# Patient Record
Sex: FEMALE | Race: WHITE
[De-identification: names, ages, dates, MRNs, and addresses within clinical notes are randomized per-mention and may not be internally consistent; named-entity substitution may affect disease eponyms.]

---

## 2017-11-17 ENCOUNTER — HOSPITAL ENCOUNTER (INPATIENT)
Dept: HOSPITAL 1 - ED | Age: 57
LOS: 4 days | Discharge: HOME | DRG: 871 | End: 2017-11-21
Attending: FAMILY MEDICINE | Admitting: FAMILY MEDICINE
Payer: COMMERCIAL

## 2017-11-17 VITALS — BODY MASS INDEX: 27.05 KG/M2 | WEIGHT: 147 LBS | HEIGHT: 62 IN

## 2017-11-17 DIAGNOSIS — R31.9: ICD-10-CM

## 2017-11-17 DIAGNOSIS — M06.9: ICD-10-CM

## 2017-11-17 DIAGNOSIS — K21.9: ICD-10-CM

## 2017-11-17 DIAGNOSIS — I10: ICD-10-CM

## 2017-11-17 DIAGNOSIS — E03.9: ICD-10-CM

## 2017-11-17 DIAGNOSIS — K22.10: ICD-10-CM

## 2017-11-17 DIAGNOSIS — E87.6: ICD-10-CM

## 2017-11-17 DIAGNOSIS — R65.10: ICD-10-CM

## 2017-11-17 DIAGNOSIS — A41.9: Primary | ICD-10-CM

## 2017-11-17 DIAGNOSIS — F41.9: ICD-10-CM

## 2017-11-17 DIAGNOSIS — Z90.49: ICD-10-CM

## 2017-11-17 DIAGNOSIS — N17.0: ICD-10-CM

## 2017-11-17 DIAGNOSIS — K52.9: ICD-10-CM

## 2017-11-17 DIAGNOSIS — M79.7: ICD-10-CM

## 2017-11-17 PROCEDURE — C9113 INJ PANTOPRAZOLE SODIUM, VIA: HCPCS

## 2017-11-18 VITALS — SYSTOLIC BLOOD PRESSURE: 151 MMHG | DIASTOLIC BLOOD PRESSURE: 84 MMHG

## 2017-11-18 VITALS — SYSTOLIC BLOOD PRESSURE: 155 MMHG | DIASTOLIC BLOOD PRESSURE: 98 MMHG

## 2017-11-18 VITALS — DIASTOLIC BLOOD PRESSURE: 91 MMHG | SYSTOLIC BLOOD PRESSURE: 157 MMHG

## 2017-11-18 VITALS — SYSTOLIC BLOOD PRESSURE: 178 MMHG | DIASTOLIC BLOOD PRESSURE: 95 MMHG

## 2017-11-18 VITALS — SYSTOLIC BLOOD PRESSURE: 193 MMHG | DIASTOLIC BLOOD PRESSURE: 102 MMHG

## 2017-11-18 VITALS — DIASTOLIC BLOOD PRESSURE: 101 MMHG | SYSTOLIC BLOOD PRESSURE: 154 MMHG

## 2017-11-18 VITALS — DIASTOLIC BLOOD PRESSURE: 71 MMHG | SYSTOLIC BLOOD PRESSURE: 156 MMHG

## 2017-11-18 LAB
ALBUMIN SERPL-MCNC: 3.9 G/DL (ref 3.4–5)
ALP SERPL-CCNC: 135 U/L (ref 46–116)
ALT SERPL-CCNC: 20 U/L (ref 14–59)
AMYLASE SERPL-CCNC: 45 U/L (ref 25–115)
AST SERPL-CCNC: 26 U/L (ref 15–37)
BASOPHILS NFR BLD: 0.1 % (ref 0–2)
BILIRUB SERPL-MCNC: 0.78 MG/DL (ref 0.2–1)
BUN SERPL-MCNC: 27 MG/DL (ref 7–18)
CALCIUM SERPL-MCNC: 9.9 MG/DL (ref 8.5–10.1)
CHLORIDE SERPL-SCNC: 102 MMOL/L (ref 98–107)
CHOLEST SERPL-MCNC: 212 MG/DL (ref ?–200)
CHOLEST/HDLC SERPL: 3.3 MG/DL
CO2 SERPL-SCNC: 17.9 MMOL/L (ref 21–32)
CREAT SERPL-MCNC: 2 MG/DL (ref 0.6–1)
ERYTHROCYTE [DISTWIDTH] IN BLOOD BY AUTOMATED COUNT: 15.3 % (ref 11.5–14.5)
GFR SERPLBLD BASED ON 1.73 SQ M-ARVRAT: 27 ML/MIN
GLUCOSE SERPL-MCNC: 149 MG/DL (ref 74–106)
HDLC SERPL-MCNC: 64 MG/DL (ref 40–60)
LIPASE SERPL-CCNC: 122 IU/L (ref 73–393)
MAGNESIUM SERPL-MCNC: 1.4 MG/DL (ref 1.8–2.4)
MICROSCOPIC UR-IMP: YES
PHOSPHATE SERPL-MCNC: 3.2 MG/DL (ref 2.5–4.9)
PLATELET # BLD: 339 X10^3MCL (ref 130–400)
POTASSIUM SERPL-SCNC: 3 MMOL/L (ref 3.5–5.1)
PROT SERPL-MCNC: 8.5 G/DL (ref 6.4–8.2)
RBC # UR STRIP.AUTO: (no result) /UL
SODIUM SERPL-SCNC: 138 MMOL/L (ref 136–145)
T4 FREE SERPL-MCNC: 0.82 NG/DL (ref 0.76–1.46)
TRIGL SERPL-MCNC: 158 MG/DL (ref ?–150)
UA SPECIFIC GRAVITY: 1.02 (ref 1–1.03)

## 2017-11-18 NOTE — NUR
PT SEEN, ASLEEP BUT EASILY AROUSABLE, ALERT AND ORIENTED, DENIES HEADACHE OR
DIZZINESS, BREATHING EVEN AND UNLABORED, NO SOB, LUNG SOUNDS CLEAR, ON ROOM
AIR WITH NO RESP DISTRESS NOTED, IVF INFUSING WELL, PULSES PALPABLE, NO EDEMA
NOTED, MILD GENERALIZED WEAKNESS, AMBULATORY WITH MINIMAL ASSIST, ABD SOFT AND
FLAT WITH ACTIVE BS, NO BM AT THIS TIME, POOR APPETITE AND C/O OF NAUSEOUS AT
TIMES, VOIDING FREELY, NO DISTRESS NOTED, WILL KEEP TO MONITOR.

## 2017-11-18 NOTE — NUR
PT GIVEN PHENERGEN PER DR. HUMMEL, PT STATED SOME ALLEVIATION OF NAUSEA,
ABLE TO TAKE MEDICATIONS, DENIES SOB, DENIES PAIN, CALL LIGHT WITHIN REACH,
WILL CONTINUE TO MONITOR.

## 2017-11-18 NOTE — NUR
CT TECH CALLED REGARDING PT'S CT ABD/PELVIS WITH IV CONTRAST, UNABLE TO DO
TEST AT THIS TIME DUE TO ELEVATED BUN/CR-27/2.0, MADE DR GUTIERREZ AWARE, PER DR GUTIERREZ WILL RE-DRAW PT'S LAB IN AM AND WILL DECIDE BY DAY TEAM.

## 2017-11-18 NOTE — NUR
PT RECEIVED DURING CHANGE OF SHIFT, A/OX4, TELE 38, NSR, DENIES CHEST PAIN,
PULSES PRESENT, NO EDEMA NOTED, LUNGS CTA ON RA, BREATHING EVEN AND UNLABORED,
DENIES SOB, BOWEL SOUNDS ACTIVE, CURRENTLY DENIES N/V, LBM 11/17/17 FORMED,
ABLE TO VOID, MILD GENERALIZED WEAKNESS, SKIN WARM/DRY/INTACT, DENIES ALL PAIN
AT THIS TIME, IV TO LAC INFUSING NS AT 100ML/HR, CALL LIGHT WITHIN REACH, WILL
CONTINUE TO MONITOR.

## 2017-11-18 NOTE — NUR
RECEIVED PT FROM ED. PT C/O CHEST PAIN 6/10, BLOOD PRESSURE 193/102, TEMP
101.2. DR WASHINGTON INFORMED. MEDICATED PER EMAR. ORIENTED PT TO ROOM. BED IN
LOWEST POSITION, SIDE RAILS UP X2, CALL LIGHT WITHIN REACH. WILL CONTINUE TO
MONITOR.

## 2017-11-18 NOTE — NUR
PT ASLEEP, NO INDICATION OF PAIN, BREATHING EVEN AND UNLABORED, CALL LIGHT
WITHIN REACH, WILL CONTINUE TO MONITOR.

## 2017-11-18 NOTE — NUR
PT C/O NASEA, WILL MEDICATE PER EMAR, UNABLE TO EAT DINNER, CALL LIGHT WITHIN
REACH, WILL ENDORSE PT TO ONCOMING SHIFT.

## 2017-11-18 NOTE — NUR
Initial Nutrition Assessment-
 
Dx: vomiting x 3 days, chest pain, shaking, MONIQUE, metabolic acidosis.
PMHx: hypothyroidism, RA, fibromyalgia, RA, GERD
PSHx: appendectomy, cholecystectomy, , gastric bypass, hip
replacement, rt shoulder reapir
Labs: Na 138, K 3 L, Glucose 149 H, BUN 27 H, Cr 2 H, Total protein 8.5 H, Alb
3.9, Ca 9.9, Phos 3.2,  H, Chol 212 H,  H, HDL 64 H, Lipase 122,
Hgb 12.9, HCT 39, HgbA1c  5.4.
Meds: aspirin, Ativan, Colace, flagyl, hydralazine, hydrochlorothiazide,
levaquin, Lipitor, Lopressor, lyrica, mag sulfate in sodium chl 0.9%,
magnesium sulfate, Mylanta, norco, protonix, reglan, synthroid, sodium chl
0.9%, synthroid, Tylenol, Zestril, zofran
Diet: NPO except meds
PO Intakes:  NPO
Ht:   62 inches (5'2") Wt: 66.81 kg (147 pounds)  BMI:   26.9 kg/m2,
overweight for age.
IBW:  110 pounds   %IBW: 134%  AdjBW due to > 125%IBW: 54 kg  UBW: Unable to
obtain; patient sleeping.
Age: 56
Food Allergies: No known food allergies
Skin: Michael 19
Edema: None noted
GI: Last BM 17, noted vomiting
 
Pt admitted with dx: SIRS, chest pain 2/2 costochondritis vs GERD, r/o ACS,
VMN, hypokalemia, hematuria, anxiety.
 
Per RN, patient experienced N/V this morning, Zofran and Phenergan given,
remains NPO. Patient asleep at time of visit, unable to interview.
 
Estimated Nutritional Needs Based on adjusted body weight of 54 kg due to
patient > 125%IBW.
Energy:  2769-0685 kcal/d (25-30 kcal/kg for adult maintenance, MONIQUE)
Protein: 32-43 gm/d (0.6-0.8 gm/kg for adult maintenance, MONIQUE)
Fluid:  8702-5309  mL/d (1 mL/kcal) or per MD.
 
Nutrition Diagnosis
1. Altered GI function related to pathophysiological causes as evidenced by
vomiting x 3 days.
2. Overweight for age related to energy imbalance as evidenced by BMI of 26.9
kg/m2.
 
Intervention/RDN Recommendation(s):
1. When medically appropriate per MD, consider initiating cardiac diet.
 
Monitor/Evaluate
 Goal: Initiation of oral nutrition with intake via PO intakes to meet least
75% of estimated needs.
 Monitor: Diet advancement, PO intakes and/or nutrition support tolerance,
Labs, GI function
 F/U in 3-5 days as moderate risk (-)

## 2017-11-18 NOTE — NUR
RDN Recommendation(s):
1. Continue NPO as medically appropriate per MD.
2. When medically appropriate per MD, consider initiating clear liquid diet
and advancing to cardiac diet as tolerated.
3. Consult RDN prn.

## 2017-11-18 NOTE — NUR
PATIENT SEEN WITH COMPLAINT OF VOMITING , CHEST PAIN AND BEING SHAKY. WAS SEEN
IN URGENT CARE EARLIER

## 2017-11-18 NOTE — NUR
BLOOD PRESSURE 157/91, TEMP REDUCED TO 99.6 POST MEDICATION ADMINISTRATION.
WILL CONTINUE TO MONITOR.

## 2017-11-18 NOTE — NUR
PT SLEPT PERIODICALLY THROUGHOUT NIGHT, NO ACUTE DISTRESS. ALL NEEDS MET AND
ATTENDED TO. NO SIGNIFICANT CHANGES. IV PATENT AND INTACT. BED IN LOWEST
POSITION, SIDE RAILS UP X2, CALL LIGHT WITHIN REACH. WILL ENDORSE CARE TO
ONCOMING NURSE.

## 2017-11-18 NOTE — NUR
PT DENIES SOB, DENIES PAIN, C/O NAUSEA, MEDICATED PER EMAR, FAMILY AT BEDSIDE,
CALL LIGHT WITHIN REACH, WILL CONTINUE TO MONITOR.

## 2017-11-19 VITALS — DIASTOLIC BLOOD PRESSURE: 57 MMHG | SYSTOLIC BLOOD PRESSURE: 88 MMHG

## 2017-11-19 VITALS — DIASTOLIC BLOOD PRESSURE: 62 MMHG | SYSTOLIC BLOOD PRESSURE: 96 MMHG

## 2017-11-19 VITALS — SYSTOLIC BLOOD PRESSURE: 108 MMHG | DIASTOLIC BLOOD PRESSURE: 60 MMHG

## 2017-11-19 VITALS — SYSTOLIC BLOOD PRESSURE: 114 MMHG | DIASTOLIC BLOOD PRESSURE: 73 MMHG

## 2017-11-19 VITALS — SYSTOLIC BLOOD PRESSURE: 119 MMHG | DIASTOLIC BLOOD PRESSURE: 76 MMHG

## 2017-11-19 VITALS — DIASTOLIC BLOOD PRESSURE: 49 MMHG | SYSTOLIC BLOOD PRESSURE: 91 MMHG

## 2017-11-19 LAB
AMPHETAMINES UR QL SCN: (no result)
BASOPHILS NFR BLD: 0 % (ref 0–2)
BUN SERPL-MCNC: 25 MG/DL (ref 7–18)
CALCIUM SERPL-MCNC: 9 MG/DL (ref 8.5–10.1)
CHLORIDE SERPL-SCNC: 106 MMOL/L (ref 98–107)
CO2 SERPL-SCNC: 20.9 MMOL/L (ref 21–32)
CREAT SERPL-MCNC: 1.2 MG/DL (ref 0.6–1)
ERYTHROCYTE [DISTWIDTH] IN BLOOD BY AUTOMATED COUNT: 15.7 % (ref 11.5–14.5)
GFR SERPLBLD BASED ON 1.73 SQ M-ARVRAT: 49 ML/MIN
GLUCOSE SERPL-MCNC: 89 MG/DL (ref 74–106)
MAGNESIUM SERPL-MCNC: 1.8 MG/DL (ref 1.8–2.4)
PHOSPHATE SERPL-MCNC: 3.3 MG/DL (ref 2.5–4.9)
PLATELET # BLD: 288 X10^3MCL (ref 130–400)
POTASSIUM SERPL-SCNC: 3.9 MMOL/L (ref 3.5–5.1)
SODIUM SERPL-SCNC: 138 MMOL/L (ref 136–145)

## 2017-11-19 NOTE — NUR
PT DENIES SOB, STATES ABD PAIN TOLERABLE, STATES NAUSEA TOLERABLE, CALL LIGHT
WITHIN REACH, WILL CONTINUE TO MONITOR.

## 2017-11-19 NOTE — NUR
PT STATES SHE ATE LITTLE FOOD, STATES AFTER EATING SHE FELT "PRESSURE" IN HER
ABDOMEN, STATES NAUSEA MINIMAL, MEDS GIVEN, CALL LIGHT WITHIN REACH, WILL
CONTINUE TO MONITOR.

## 2017-11-19 NOTE — NUR
PT RECEIVED DURING CHANGE OF SHIFT, ASLEEP BUT AROUSABLE, TELE 38, NSR, PULSES
PRESENT, LUNGS CTA ON RA, BREATHING EVEN AND UNLABORED, BOWEL SOUNDS ACTIVE,
REPORTS OF N/V DURING PREVIOUS SHIFT, ABLE TO VOID, MILD GENERALIZED WEAKNESS,
AMBULATORY, SKIN WARM/DRY/INTACT, NO INDICATION OF PAIN, IV TO LFA INFUSING NS
AT 100ML/HR, IV WNL, CALL LIGHT WITHIN REACH, WILL CONTINUE TO MONITOR.

## 2017-11-19 NOTE — NUR
ALL DUE MEDS GIVEN EXCEPT METOPROLOL AND HYDRALAZINE PO DUE TO SBP<100, PT C/O
OF NAUSEOUS, ZOFRAN 4MG VIA IVP ADMINISTERED.

## 2017-11-19 NOTE — NUR
PT AWAKE AND RESTING IN BED, SLEPT MOST OF NIGHT, C/O OF NAUSEOUS THIS
MORNING, ZOFRAN 4MG VIA IVP ADMINISTERED, STILL WITH POOR APPETITE, OLD IV
SITE INFILTRATED, REINSERTED TO LFA WITH 22G, NO DISTRESS NOTED, WILL KEEP TO
MONITOR.

## 2017-11-19 NOTE — NUR
PT STATED "I JUST FEEL DRAINED AFTER EATING, LIKE REALLY TIRED.", CALL LIGHT
WITHIN REACH, WILL ENDORSE PT TO NEXT SHIFT.

## 2017-11-19 NOTE — NUR
PT SEEN, RESTING IN BED, ALERT AND ORIENTED, DENIES HEADACHE OR DIZZINESS,
BREATHING EVEN AND UNLABORED, NO SOB, LUNG SOUNDS CLEAR, ON ROOM AIR WITH NO
RESP DISTRESS NOTED, ON TELE#38 NSR, DENIES CHEST PAIN, RECHECK PT'S BP-91/49,
PT DENIES ANY DISCOMFORT, IVF INFUSING WELL, PULSES PALPABLE, NO EDEMA NOTED,
MILD GENERALIZED WEAKNESS, AMBULATORY WITH MINIMAL ASSIST, ABD SOFT AND FLAT
WITH ACTIVE BS, NO BM AT THIS TIME, HAD WATERY STOOL DURING THE DAY SHIFT,
POOR APPETITE AND C/O OF NAUSEOUS AT TIMES, VOIDING FREELY, NO DISTRESS NOTED,
WILL KEEP TO MONITOR.

## 2017-11-19 NOTE — NUR
PT DENIES SOB, DENIES N/V, DENIES PAIN, REPORTS WATERY STOOL BEFORE DRINKING
MAG CITRATE, PT DID NOT DRINK MAG CITRATE, DR. HUMMEL AWARE, MEDICATION
WASTED, CALL LIGHT WITHIN REACH, WILL CONTINUE TO MONITOR.

## 2017-11-19 NOTE — NUR
PT DENIES SOB, C/O ABD PAIN 5/10, MEDICATED PER EMAR, CALL LIGHT WITHIN REACH,
WILL CONTINUE TO MONITOR.

## 2017-11-19 NOTE — NUR
PT DENIES SOB, C/O PAIN 5/10, C/O NAUSEA, MEDICATED PER EMAR, CALL LIGHT
WITHIN REACH, WILL CONTINUE TO MONITOR.

## 2017-11-19 NOTE — NUR
BP 88/50 (60), PT STATES ASYMPTOMATIC OF LOW BP, DR. HUMMEL AWARE, PT TO BE
MONITORED FOR NOW PER DR. HUMMEL.

## 2017-11-20 VITALS — DIASTOLIC BLOOD PRESSURE: 79 MMHG | SYSTOLIC BLOOD PRESSURE: 129 MMHG

## 2017-11-20 VITALS — SYSTOLIC BLOOD PRESSURE: 92 MMHG | DIASTOLIC BLOOD PRESSURE: 51 MMHG

## 2017-11-20 VITALS — SYSTOLIC BLOOD PRESSURE: 98 MMHG | DIASTOLIC BLOOD PRESSURE: 68 MMHG

## 2017-11-20 VITALS — DIASTOLIC BLOOD PRESSURE: 68 MMHG | SYSTOLIC BLOOD PRESSURE: 99 MMHG

## 2017-11-20 VITALS — SYSTOLIC BLOOD PRESSURE: 90 MMHG | DIASTOLIC BLOOD PRESSURE: 55 MMHG

## 2017-11-20 LAB
BASOPHILS NFR BLD: 0.4 % (ref 0–2)
BASOPHILS NFR BLD: 0.6 % (ref 0–2)
BUN SERPL-MCNC: 28 MG/DL (ref 7–18)
CALCIUM SERPL-MCNC: 8.4 MG/DL (ref 8.5–10.1)
CHLORIDE SERPL-SCNC: 108 MMOL/L (ref 98–107)
CO2 SERPL-SCNC: 21.7 MMOL/L (ref 21–32)
CREAT SERPL-MCNC: 1.6 MG/DL (ref 0.6–1)
ERYTHROCYTE [DISTWIDTH] IN BLOOD BY AUTOMATED COUNT: 15.4 % (ref 11.5–14.5)
ERYTHROCYTE [DISTWIDTH] IN BLOOD BY AUTOMATED COUNT: 15.8 % (ref 11.5–14.5)
GFR SERPLBLD BASED ON 1.73 SQ M-ARVRAT: 35 ML/MIN
GLUCOSE SERPL-MCNC: 80 MG/DL (ref 74–106)
MAGNESIUM SERPL-MCNC: 1.6 MG/DL (ref 1.8–2.4)
PHOSPHATE SERPL-MCNC: 3.2 MG/DL (ref 2.5–4.9)
PLATELET # BLD: 218 X10^3MCL (ref 130–400)
PLATELET # BLD: 239 X10^3MCL (ref 130–400)
POTASSIUM SERPL-SCNC: 3.8 MMOL/L (ref 3.5–5.1)
SODIUM SERPL-SCNC: 138 MMOL/L (ref 136–145)

## 2017-11-20 NOTE — NUR
PT ASLEEP BUT EASILY AROUSABLE, SLEPT MOST OF NIGHT, IVF INFUSING WELL, C/O OF
GENERALIZED BODYACHE THIS MORNING, NORCO 1 TAB VIA ORAL ADMNISTERED, STILL
WITH ON AND OFF NAUSEOUS, ZOFRAN GIVEN WITH GOOD RELIEF, NO BM DURING THE
SHIFT, NO DISTRESS NOTED, WILL KEEP TO MONITOR.

## 2017-11-20 NOTE — NUR
RECEIVED PT IN BED AWAKE, ALERT,ORIENTED X4. LUNGS CTA. NO SOB ON RA.BOWEL
SOUNDS ACTIVE . PT C/O ABDL PAIN 8/10. NO C/O N/V. W/ HL TO LTFA INTACT. CALL
LIGHT W/IN REACH.

## 2017-11-20 NOTE — NUR
RECEIVED THE PATIENT AWAKE AND ORIENTED TO PERSON, PLACE AND TIME.
PATIENT C/O FATIGUE AND SOME PRESSURE IN EPIGASTRIC REGION, BUT TOLERABLE AT
THIS TIME.
CONTINUE TO MONITOR AND MEDICATE FOR PAIN PRN.
IVF NS VIA H/L TO LFA.
TELE # 38 READS SINUS RHYTHMS.
CALL LIGHT WITHIN REACH.
SIDE RAILS UP X3.

## 2017-11-20 NOTE — NUR
DR. BANG AND THE TEAM WERE MAKING ROUND TO SEE THE PATIENT.
THE CARE PLAN WAS DISCUSSED WITH THE PATIENT, AND THE PATIENT AGREED WITH THE
PLAN.
DR. HUMMEL-RESIDENT WAS INFORMED THAT THE PATIENT'S BP 90/55, SO ALL THE BP
MEDS THIS MORNING INCLUDING METOPROLOL 12.5 MG PO, HCTZ 25MG PO, AND
LISINOPRIL 5MG PO WERE HELD THIS MORNING. DOCTOR AGREED WITH THAT.

## 2017-11-21 VITALS — SYSTOLIC BLOOD PRESSURE: 99 MMHG | DIASTOLIC BLOOD PRESSURE: 59 MMHG

## 2017-11-21 VITALS — DIASTOLIC BLOOD PRESSURE: 63 MMHG | SYSTOLIC BLOOD PRESSURE: 106 MMHG

## 2017-11-21 LAB
BASOPHILS NFR BLD: 0.6 % (ref 0–2)
BUN SERPL-MCNC: 22 MG/DL (ref 7–18)
CALCIUM SERPL-MCNC: 8.4 MG/DL (ref 8.5–10.1)
CHLORIDE SERPL-SCNC: 109 MMOL/L (ref 98–107)
CO2 SERPL-SCNC: 23.6 MMOL/L (ref 21–32)
CREAT SERPL-MCNC: 1.5 MG/DL (ref 0.6–1)
ERYTHROCYTE [DISTWIDTH] IN BLOOD BY AUTOMATED COUNT: 15.8 % (ref 11.5–14.5)
GFR SERPLBLD BASED ON 1.73 SQ M-ARVRAT: 38 ML/MIN
GLUCOSE SERPL-MCNC: 88 MG/DL (ref 74–106)
MAGNESIUM SERPL-MCNC: 2.3 MG/DL (ref 1.8–2.4)
PHOSPHATE SERPL-MCNC: 3.5 MG/DL (ref 2.5–4.9)
PLATELET # BLD: 220 X10^3MCL (ref 130–400)
POTASSIUM SERPL-SCNC: 4.1 MMOL/L (ref 3.5–5.1)
SODIUM SERPL-SCNC: 139 MMOL/L (ref 136–145)

## 2017-11-21 PROCEDURE — 0DB58ZX EXCISION OF ESOPHAGUS, VIA NATURAL OR ARTIFICIAL OPENING ENDOSCOPIC, DIAGNOSTIC: ICD-10-PCS | Performed by: INTERNAL MEDICINE

## 2017-11-21 PROCEDURE — 0DB68ZX EXCISION OF STOMACH, VIA NATURAL OR ARTIFICIAL OPENING ENDOSCOPIC, DIAGNOSTIC: ICD-10-PCS | Performed by: INTERNAL MEDICINE

## 2017-11-21 NOTE — NUR
RESUME CARE: PATIENT AWAKE AND ORIENTED TO PERSON, PLACE AND TIME.
DENIED NAUSEA/VOMITING AT THIS TIME. PATIENT STATED HAVING MILD PRESSURE IN
UPPER MIDDLE ABDOMEN AND TOLERATED WITH THE PAIN AT THIS TIME.
IVF D5NS VIA H/L TO LFA.
TELE # 38 READS SINUS RHYTHMS.
CALL LIGHT WITHIN REACH.
SIDE RAILS UP X3.

## 2017-11-21 NOTE — NUR
PT SLEPT AT LONG INTERVALS. SHE WAS MEDICATED FOR ABDL PAIN X1. NO N/V. PT HAD
NO BM THIS SHIFT. PT KEPT ON NPO STATUS FOR EGD TODAY. W/ IVF D5NS AT 80 CC/HR
VIA LTFA.

## 2017-11-21 NOTE — NUR
RECEIVING THE PATIENT BACK FROM RECOVERY ROOM S/P EGD.
THE PATIENT ALERT AND ORIENTED TO PERSON, PLACE AND TIME.
DENIED ANY NAUSEA/VOMITING OR PAIN AT THIS TIME.
VS CHECKED (SEE VS DOC).
IV H/L WAS CONNECTED TO IV LINE.
CALL LIGHT WITHIN REACH.
SIDE RAILS UP X3.
WILL MEDICATE THE PATIENT WITH AM SCHEDULED MEDS.

## 2017-11-21 NOTE — NUR
THE PATIENT WAS TAKEN TO THE DISCHARGE OFFICE VIA WHEELCHAIR IN STABLE
CONDITION. ALL BELONGINGS WERE SENT HOME WITH THE PATIENT UPON DISCHARGE.

## 2017-11-21 NOTE — NUR
DISCHARGE INSTRUCTION WAS EXPLAINED THOROUGHLY TO THE PATIENT. QUESTIONS WERE
ADDRESSED, AND THE PATIENT VERBALIZED UNDERSTANDING.
H/L WAS REMOVED WITH THE CATH INTACT.
THE PATIENT IS WAITING FOR A RIDE.

## 2017-11-21 NOTE — NUR
DR. MONROE AND THE TEAM WERE MAKIN ROUND TO SEE THE PATIENT. THE CARE PLAN WAS
DISCUSSED WITH THE PATIENT, AND THE PATIENT AGREED WITH THE PLAN.

## 2018-01-19 ENCOUNTER — HOSPITAL ENCOUNTER (EMERGENCY)
Dept: HOSPITAL 1 - ED | Age: 58
Discharge: HOME | End: 2018-01-19
Payer: COMMERCIAL

## 2018-01-19 VITALS
WEIGHT: 137 LBS | HEIGHT: 62 IN | WEIGHT: 137 LBS | BODY MASS INDEX: 25.21 KG/M2 | BODY MASS INDEX: 25.21 KG/M2 | HEIGHT: 62 IN

## 2018-01-19 VITALS — DIASTOLIC BLOOD PRESSURE: 81 MMHG | SYSTOLIC BLOOD PRESSURE: 126 MMHG

## 2018-01-19 DIAGNOSIS — K25.9: Primary | ICD-10-CM

## 2018-01-19 DIAGNOSIS — K21.9: ICD-10-CM

## 2018-01-19 DIAGNOSIS — M79.7: ICD-10-CM

## 2018-01-19 DIAGNOSIS — Z90.49: ICD-10-CM

## 2018-01-19 DIAGNOSIS — E07.9: ICD-10-CM

## 2018-01-19 LAB
ALBUMIN SERPL-MCNC: 2.4 G/DL (ref 3.4–5)
ALP SERPL-CCNC: 115 U/L (ref 46–116)
ALT SERPL-CCNC: 12 U/L (ref 14–59)
AST SERPL-CCNC: 15 U/L (ref 15–37)
BASOPHILS NFR BLD: 1.8 % (ref 0–2)
BILIRUB SERPL-MCNC: 0.51 MG/DL (ref 0.2–1)
BUN SERPL-MCNC: 33 MG/DL (ref 7–18)
CALCIUM SERPL-MCNC: 8.9 MG/DL (ref 8.5–10.1)
CHLORIDE SERPL-SCNC: 105 MMOL/L (ref 98–107)
CO2 SERPL-SCNC: 21 MMOL/L (ref 21–32)
CREAT SERPL-MCNC: 1.6 MG/DL (ref 0.6–1)
ERYTHROCYTE [DISTWIDTH] IN BLOOD BY AUTOMATED COUNT: 16.5 % (ref 11.5–14.5)
GFR SERPLBLD BASED ON 1.73 SQ M-ARVRAT: 35 ML/MIN
GLUCOSE SERPL-MCNC: 106 MG/DL (ref 74–106)
LIPASE SERPL-CCNC: 116 IU/L (ref 73–393)
PLATELET # BLD: 300 X10^3MCL (ref 130–400)
POTASSIUM SERPL-SCNC: 3.7 MMOL/L (ref 3.5–5.1)
PROT SERPL-MCNC: 5.9 G/DL (ref 6.4–8.2)
SODIUM SERPL-SCNC: 138 MMOL/L (ref 136–145)

## 2018-01-19 PROCEDURE — C9113 INJ PANTOPRAZOLE SODIUM, VIA: HCPCS

## 2018-07-20 ENCOUNTER — HOSPITAL ENCOUNTER (INPATIENT)
Dept: HOSPITAL 1 - ED | Age: 58
LOS: 1 days | Discharge: HOME | DRG: 91 | End: 2018-07-21
Attending: INTERNAL MEDICINE | Admitting: INTERNAL MEDICINE
Payer: COMMERCIAL

## 2018-07-20 VITALS — SYSTOLIC BLOOD PRESSURE: 117 MMHG | DIASTOLIC BLOOD PRESSURE: 52 MMHG

## 2018-07-20 VITALS — SYSTOLIC BLOOD PRESSURE: 141 MMHG | DIASTOLIC BLOOD PRESSURE: 74 MMHG

## 2018-07-20 VITALS
HEIGHT: 63 IN | WEIGHT: 145.38 LBS | WEIGHT: 145.38 LBS | BODY MASS INDEX: 25.76 KG/M2 | HEIGHT: 63 IN | BODY MASS INDEX: 25.76 KG/M2

## 2018-07-20 VITALS — SYSTOLIC BLOOD PRESSURE: 123 MMHG | DIASTOLIC BLOOD PRESSURE: 69 MMHG

## 2018-07-20 DIAGNOSIS — N18.9: ICD-10-CM

## 2018-07-20 DIAGNOSIS — E66.01: ICD-10-CM

## 2018-07-20 DIAGNOSIS — D50.9: ICD-10-CM

## 2018-07-20 DIAGNOSIS — I12.9: ICD-10-CM

## 2018-07-20 DIAGNOSIS — N17.0: ICD-10-CM

## 2018-07-20 DIAGNOSIS — T50.905A: ICD-10-CM

## 2018-07-20 DIAGNOSIS — Z96.641: ICD-10-CM

## 2018-07-20 DIAGNOSIS — E78.5: ICD-10-CM

## 2018-07-20 DIAGNOSIS — R55: ICD-10-CM

## 2018-07-20 DIAGNOSIS — M06.9: ICD-10-CM

## 2018-07-20 DIAGNOSIS — Z98.84: ICD-10-CM

## 2018-07-20 DIAGNOSIS — Z79.899: ICD-10-CM

## 2018-07-20 DIAGNOSIS — E03.9: ICD-10-CM

## 2018-07-20 DIAGNOSIS — E44.0: ICD-10-CM
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
## 2018-07-20 DIAGNOSIS — G92: Primary | ICD-10-CM

## 2018-07-20 DIAGNOSIS — Z90.49: ICD-10-CM

## 2018-07-20 LAB
ALBUMIN SERPL-MCNC: 2.3 G/DL (ref 3.4–5)
ALP SERPL-CCNC: 118 U/L (ref 46–116)
ALT SERPL-CCNC: 13 U/L (ref 14–59)
AMPHETAMINES UR QL SCN: (no result)
AST SERPL-CCNC: 11 U/L (ref 15–37)
BASOPHILS NFR BLD: 0 % (ref 0–2)
BILIRUB SERPL-MCNC: 0.21 MG/DL (ref 0.2–1)
BUN SERPL-MCNC: 34 MG/DL (ref 7–18)
CALCIUM SERPL-MCNC: 8.7 MG/DL (ref 8.5–10.1)
CHLORIDE SERPL-SCNC: 114 MMOL/L (ref 98–107)
CHOLEST SERPL-MCNC: 122 MG/DL (ref ?–200)
CHOLEST/HDLC SERPL: 3.4 MG/DL
CO2 SERPL-SCNC: 21 MMOL/L (ref 21–32)
CREAT SERPL-MCNC: 1.5 MG/DL (ref 0.6–1)
ERYTHROCYTE [DISTWIDTH] IN BLOOD BY AUTOMATED COUNT: 15.6 % (ref 11.5–14.5)
GFR SERPLBLD BASED ON 1.73 SQ M-ARVRAT: 38 ML/MIN
GLUCOSE SERPL-MCNC: 88 MG/DL (ref 74–106)
HDLC SERPL-MCNC: 36 MG/DL (ref 40–60)
MICROSCOPIC UR-IMP: NO
MONOCYTES NFR BLD: 6 % (ref 0–7)
NEUTS BAND NFR BLD: 0 % (ref 0–10)
NEUTS SEG NFR BLD MANUAL: 62 % (ref 37–75)
PLAT MORPH BLD: (no result)
PLATELET # BLD: 158 X10^3MCL (ref 130–400)
POTASSIUM SERPL-SCNC: 4.1 MMOL/L (ref 3.5–5.1)
PROT SERPL-MCNC: 5.5 G/DL (ref 6.4–8.2)
RBC # UR STRIP.AUTO: NEGATIVE /UL
RBC MORPH BLD: (no result)
SODIUM SERPL-SCNC: 142 MMOL/L (ref 136–145)
T3 SERPL-MCNC: 0.72 NG/ML
T3RU NFR SERPL: 31 % UPTAKE (ref 30–39)
T4 FREE SERPL-MCNC: 0.93 NG/DL (ref 0.76–1.46)
T4 FREE SERPL-MCNC: 0.96 NG/DL (ref 0.76–1.46)
T4 SERPL-MCNC: 5.7 UG/DL (ref 4.7–13.3)
T4/T3 UPTAKE INDEX SERPL: 1.8 UG/DL (ref 1.4–4.5)
TRIGL SERPL-MCNC: 65 MG/DL (ref ?–150)
UA SPECIFIC GRAVITY: 1.01 (ref 1–1.03)

## 2018-07-20 PROCEDURE — C9113 INJ PANTOPRAZOLE SODIUM, VIA: HCPCS

## 2018-07-20 PROCEDURE — G0480 DRUG TEST DEF 1-7 CLASSES: HCPCS

## 2018-07-21 VITALS — DIASTOLIC BLOOD PRESSURE: 74 MMHG | SYSTOLIC BLOOD PRESSURE: 132 MMHG

## 2018-07-21 VITALS — DIASTOLIC BLOOD PRESSURE: 63 MMHG | SYSTOLIC BLOOD PRESSURE: 111 MMHG

## 2018-07-21 VITALS — DIASTOLIC BLOOD PRESSURE: 73 MMHG | SYSTOLIC BLOOD PRESSURE: 138 MMHG

## 2018-07-21 VITALS — SYSTOLIC BLOOD PRESSURE: 149 MMHG | DIASTOLIC BLOOD PRESSURE: 77 MMHG

## 2018-07-21 LAB
BASOPHILS NFR BLD: 0.7 % (ref 0–2)
BUN SERPL-MCNC: 25 MG/DL (ref 7–18)
CALCIUM SERPL-MCNC: 8.8 MG/DL (ref 8.5–10.1)
CHLORIDE SERPL-SCNC: 114 MMOL/L (ref 98–107)
CO2 SERPL-SCNC: 19.5 MMOL/L (ref 21–32)
CREAT SERPL-MCNC: 1.2 MG/DL (ref 0.6–1)
ERYTHROCYTE [DISTWIDTH] IN BLOOD BY AUTOMATED COUNT: 16.1 % (ref 11.5–14.5)
GFR SERPLBLD BASED ON 1.73 SQ M-ARVRAT: 49 ML/MIN
GLUCOSE SERPL-MCNC: 98 MG/DL (ref 74–106)
IRON SERPL-MCNC: 28 UG/DL (ref 50–170)
PLATELET # BLD: 166 X10^3MCL (ref 130–400)
POTASSIUM SERPL-SCNC: 3.6 MMOL/L (ref 3.5–5.1)
SODIUM SERPL-SCNC: 142 MMOL/L (ref 136–145)
TIBC SERPL-MCNC: 206 UG/DL (ref 250–450)

## 2020-11-17 ENCOUNTER — HOSPITAL ENCOUNTER (EMERGENCY)
Dept: HOSPITAL 1 - ED | Age: 60
Discharge: HOME | End: 2020-11-17
Payer: COMMERCIAL

## 2020-11-17 VITALS
WEIGHT: 117 LBS | BODY MASS INDEX: 21.53 KG/M2 | HEIGHT: 62 IN | HEIGHT: 62 IN | WEIGHT: 117 LBS | BODY MASS INDEX: 21.53 KG/M2

## 2020-11-17 VITALS — DIASTOLIC BLOOD PRESSURE: 64 MMHG | SYSTOLIC BLOOD PRESSURE: 133 MMHG
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
## 2020-11-17 DIAGNOSIS — S00.83XA: Primary | ICD-10-CM
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
## 2020-11-17 DIAGNOSIS — S80.02XA: ICD-10-CM

## 2020-11-17 DIAGNOSIS — S80.01XA: ICD-10-CM
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
## 2025-06-11 NOTE — NUR
Bigeminy/trigeminy noted on telemetry monitoring, patient denying any chest pain, dizziness/lightheadedness, or other associated symptoms  Likely insignificant finding  2D echo EF 70%   PT DENIES SOB, STATES PAIN TOLERABLE, STATES NAUSEA TOLERABLE, CALL LIGHT
WITHIN REACH, WILL CONTINUE TO MONITOR.